# Patient Record
Sex: FEMALE | Race: WHITE | NOT HISPANIC OR LATINO | Employment: FULL TIME | URBAN - NONMETROPOLITAN AREA
[De-identification: names, ages, dates, MRNs, and addresses within clinical notes are randomized per-mention and may not be internally consistent; named-entity substitution may affect disease eponyms.]

---

## 2023-10-24 ENCOUNTER — HOSPITAL ENCOUNTER (OUTPATIENT)
Dept: RADIOLOGY | Facility: HOSPITAL | Age: 68
Discharge: HOME | End: 2023-10-24
Attending: INTERNAL MEDICINE
Payer: MEDICARE

## 2023-10-24 DIAGNOSIS — Z78.0 POST-MENOPAUSAL: ICD-10-CM

## 2023-10-24 PROCEDURE — 77080 DXA BONE DENSITY AXIAL: CPT | Mod: TC

## 2025-01-24 ENCOUNTER — OFFICE VISIT (OUTPATIENT)
Dept: PRIMARY CARE CLINIC | Facility: CLINIC | Age: 70
End: 2025-01-24
Payer: MEDICARE

## 2025-01-24 ENCOUNTER — PATIENT MESSAGE (OUTPATIENT)
Dept: PRIMARY CARE CLINIC | Facility: CLINIC | Age: 70
End: 2025-01-24

## 2025-01-24 ENCOUNTER — LAB VISIT (OUTPATIENT)
Dept: LAB | Facility: HOSPITAL | Age: 70
End: 2025-01-24
Attending: NURSE PRACTITIONER
Payer: MEDICARE

## 2025-01-24 VITALS
TEMPERATURE: 97 F | SYSTOLIC BLOOD PRESSURE: 167 MMHG | DIASTOLIC BLOOD PRESSURE: 71 MMHG | WEIGHT: 178 LBS | BODY MASS INDEX: 26.98 KG/M2 | HEIGHT: 68 IN | HEART RATE: 60 BPM | OXYGEN SATURATION: 95 %

## 2025-01-24 DIAGNOSIS — Z23 NEED FOR PNEUMOCOCCAL 20-VALENT CONJUGATE VACCINATION: ICD-10-CM

## 2025-01-24 DIAGNOSIS — M19.90 ARTHRITIS: ICD-10-CM

## 2025-01-24 DIAGNOSIS — Z11.59 ENCOUNTER FOR HEPATITIS C SCREENING TEST FOR LOW RISK PATIENT: ICD-10-CM

## 2025-01-24 DIAGNOSIS — M81.0 OSTEOPOROSIS, UNSPECIFIED OSTEOPOROSIS TYPE, UNSPECIFIED PATHOLOGICAL FRACTURE PRESENCE: ICD-10-CM

## 2025-01-24 DIAGNOSIS — H61.23 BILATERAL IMPACTED CERUMEN: ICD-10-CM

## 2025-01-24 DIAGNOSIS — H40.9 GLAUCOMA, UNSPECIFIED GLAUCOMA TYPE, UNSPECIFIED LATERALITY: ICD-10-CM

## 2025-01-24 DIAGNOSIS — Z76.89 ENCOUNTER TO ESTABLISH CARE: Primary | ICD-10-CM

## 2025-01-24 LAB — HCV AB SERPL QL IA: NORMAL

## 2025-01-24 PROCEDURE — 99205 OFFICE O/P NEW HI 60 MIN: CPT | Mod: S$PBB,,, | Performed by: NURSE PRACTITIONER

## 2025-01-24 PROCEDURE — 86803 HEPATITIS C AB TEST: CPT | Performed by: NURSE PRACTITIONER

## 2025-01-24 PROCEDURE — 36415 COLL VENOUS BLD VENIPUNCTURE: CPT | Performed by: NURSE PRACTITIONER

## 2025-01-24 PROCEDURE — 99999 PR PBB SHADOW E&M-EST. PATIENT-LVL IV: CPT | Mod: PBBFAC,,, | Performed by: NURSE PRACTITIONER

## 2025-01-24 PROCEDURE — 90677 PCV20 VACCINE IM: CPT | Mod: PBBFAC

## 2025-01-24 PROCEDURE — 99214 OFFICE O/P EST MOD 30 MIN: CPT | Mod: PBBFAC | Performed by: NURSE PRACTITIONER

## 2025-01-24 PROCEDURE — 99999PBSHW PR PBB SHADOW TECHNICAL ONLY FILED TO HB: Mod: PBBFAC,,,

## 2025-01-24 PROCEDURE — G0009 ADMIN PNEUMOCOCCAL VACCINE: HCPCS | Mod: PBBFAC

## 2025-01-24 RX ORDER — MAGNESIUM 200 MG
TABLET ORAL
COMMUNITY

## 2025-01-24 RX ORDER — FERROUS SULFATE 325(65) MG
TABLET ORAL
COMMUNITY

## 2025-01-24 RX ORDER — TIMOLOL MALEATE 5 MG/ML
1 SOLUTION/ DROPS OPHTHALMIC EVERY MORNING
COMMUNITY

## 2025-01-24 RX ORDER — CALCIUM CARB, CITRATE, MALATE 250 MG
CAPSULE ORAL
COMMUNITY

## 2025-01-24 RX ORDER — DENOSUMAB 60 MG/ML
INJECTION SUBCUTANEOUS
COMMUNITY
Start: 2024-09-30

## 2025-01-24 RX ORDER — ASPIRIN 81 MG/1
TABLET ORAL
COMMUNITY

## 2025-01-24 RX ORDER — VIT C/E/ZN/COPPR/LUTEIN/ZEAXAN 250MG-90MG
CAPSULE ORAL
COMMUNITY

## 2025-01-24 RX ORDER — VITAMIN E 268 MG
1 CAPSULE ORAL
COMMUNITY

## 2025-01-24 RX ADMIN — PNEUMOCOCCAL 20-VALENT CONJUGATE VACCINE 0.5 ML
2.2; 2.2; 2.2; 2.2; 2.2; 2.2; 2.2; 2.2; 2.2; 2.2; 2.2; 2.2; 2.2; 2.2; 2.2; 2.2; 4.4; 2.2; 2.2; 2.2 INJECTION, SUSPENSION INTRAMUSCULAR at 10:01

## 2025-01-24 NOTE — PROGRESS NOTES
[facility] Note    HPI:  Marietta Dobson is a 69 y.o. female who presents today for Establish Care (Needs prolia)     Here to establish care. No complaints today. Due for Prolia 25. 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 Benson 10/29/24. Thinks that she has Shingles vaccines with Dr. Wagner and flu shot with Dr. Benson.    Review of Systems   Constitutional: Negative.    HENT: Negative.     Eyes: Negative.    Respiratory: Negative.     Cardiovascular: Negative.    Gastrointestinal: Negative.    Genitourinary: Negative.    Musculoskeletal: Negative.    Skin: Negative.    Neurological: Negative.    Endo/Heme/Allergies: Negative.    Psychiatric/Behavioral: Negative.        A review of systems was performed and was negative except as noted above.    I personally reviewed allergies, past medical, surgical, social and family history and updated as appropriate.    Medications:    Current Outpatient Medications:     aspirin (ECOTRIN) 81 MG EC tablet, 1 tablet Orally Once a day, Disp: , Rfl:     L9-D0-IV-Zn-copper-astax-coQ10 400 mg-5 mg- 250 mcg-10 mg Tab, 1 capsule with a meal Orally Once a day, Disp: , Rfl:     cholecalciferol, vitamin D3, 125 mcg (5,000 unit) capsule, as directed Orally, Disp: , Rfl:     ferrous sulfate (FEOSOL) 325 mg (65 mg iron) Tab tablet, 1 tablet Orally Once a day, Disp: , Rfl:     Lactobacillus acidophilus (ACIDOPHILUS ORAL), as directed Orally, Disp: , Rfl:     magnesium 200 mg Tab, 2 tablets with a meal Orally Once a day, Disp: , Rfl:     potassium citrate 99 mg Cap, 1 tablet Orally Once a day, Disp: , Rfl:     PROLIA 60 mg/mL Syrg, SMARTSI Milligram(s) SUB-Q Twice a Year, Disp: , Rfl:     timolol maleate 0.5% (TIMOPTIC) 0.5 % Drop, Place 1 drop into both eyes every morning.,  Disp: , Rfl:     vitamin E 400 UNIT capsule, 1 capsule., Disp: , Rfl:   No current facility-administered medications for this visit.     Health Maintenance:  Immunization History   Administered Date(s) Administered    COVID-19, MRNA, LN-S, PF (Pfizer) (Purple Cap) 12/20/2021, 01/11/2022    Hepatitis B, Adult 02/10/2017, 04/24/2017, 09/13/2017    Influenza - Quadrivalent - High Dose - PF (65 years and older) 09/23/2020    Influenza - Quadrivalent - PF *Preferred* (6 months and older) 09/23/2017    Influenza - Trivalent - Afluria, Fluzone MDV 11/18/2005, 11/22/2015    Influenza - Trivalent - Fluzone High Dose - PF (65 years and older) 09/29/2018, 09/25/2019    Pneumococcal Conjugate - 20 Valent 01/24/2025    Pneumococcal Polysaccharide - 23 Valent 11/19/2014    Poliovirus 04/21/1963, 06/02/1963, 09/15/1964    vaccinia (smallpox) 02/02/1965      Health Maintenance   Topic Date Due    TETANUS VACCINE  Never done    Shingles Vaccine (1 of 2) Never done    Influenza Vaccine (1) 09/01/2024    COVID-19 Vaccine (3 - 2024-25 season) 09/01/2024    Mammogram  01/24/2026 (Originally 8/19/1973)    Colorectal Cancer Screening  05/02/2026    DEXA Scan  10/24/2026    RSV Vaccine (Age 60+ and Pregnant patients) (1 - 1-dose 75+ series) 08/19/2028    Lipid Panel  10/24/2029    Hepatitis C Screening  Completed    Pneumococcal Vaccines (Age 50+)  Completed     Health Maintenance Topics with due status: Not Due       Topic Last Completion Date    Colorectal Cancer Screening 05/02/2023    DEXA Scan 10/24/2023    Lipid Panel 10/24/2024    RSV Vaccine (Age 60+ and Pregnant patients) Not Due     Health Maintenance Due   Topic Date Due    TETANUS VACCINE  Never done    Shingles Vaccine (1 of 2) Never done    Influenza Vaccine (1) 09/01/2024    COVID-19 Vaccine (3 - 2024-25 season) 09/01/2024       PHYSICAL EXAM:  Vitals:    01/24/25 1009   BP: (!) 167/71   Patient Position: Sitting   Pulse: 60   Temp: 97.3 °F (36.3 °C)   SpO2: 95%   Weight:  "80.7 kg (178 lb)   Height: 5' 8" (1.727 m)     Body mass index is 27.06 kg/m².  Physical Exam  Constitutional:       Appearance: Normal appearance. She is normal weight.   HENT:      Head: Normocephalic.      Right Ear: Tympanic membrane, ear canal and external ear normal. There is impacted cerumen.      Left Ear: Tympanic membrane, ear canal and external ear normal. There is impacted cerumen.      Nose: Nose normal.      Mouth/Throat:      Mouth: Mucous membranes are moist.   Cardiovascular:      Rate and Rhythm: Normal rate and regular rhythm.      Pulses: Normal pulses.      Heart sounds: Normal heart sounds.   Pulmonary:      Effort: Pulmonary effort is normal.      Breath sounds: Normal breath sounds.   Musculoskeletal:         General: Normal range of motion.      Cervical back: Normal range of motion.   Skin:     General: Skin is warm and dry.   Neurological:      General: No focal deficit present.      Mental Status: She is alert and oriented to person, place, and time.          ASSESSMENT/PLAN:  1. Encounter to establish care    2. Need for pneumococcal 20-valent conjugate vaccination  -     pneumoc 20-mohan conj-dip cr(PF) (PREVNAR-20 (PF)) injection Syrg 0.5 mL    3. Encounter for hepatitis C screening test for low risk patient  -     Hepatitis C Antibody; Future; Expected date: 01/24/2025    4. Glaucoma, unspecified glaucoma type, unspecified laterality    5. Arthritis    6. Osteoporosis, unspecified osteoporosis type, unspecified pathological fracture presence    7. Bilateral impacted cerumen        Other than changes above, continue current medications and maintain follow up with specialists.      Follow up in about 1 week (around 1/31/2025) for bilateral ear irrigation.   Recent Results (from the past 12 weeks)   Hepatitis C Antibody    Collection Time: 01/24/25 11:47 AM   Result Value Ref Range    Hepatitis C Ab Non-reactive Non-reactive         Marcia Blereau, FNP [facility]

## 2025-02-04 ENCOUNTER — OFFICE VISIT (OUTPATIENT)
Dept: PRIMARY CARE CLINIC | Facility: CLINIC | Age: 70
End: 2025-02-04
Payer: MEDICARE

## 2025-02-04 VITALS
DIASTOLIC BLOOD PRESSURE: 86 MMHG | HEIGHT: 68 IN | OXYGEN SATURATION: 98 % | BODY MASS INDEX: 26.83 KG/M2 | WEIGHT: 177 LBS | SYSTOLIC BLOOD PRESSURE: 201 MMHG | HEART RATE: 74 BPM

## 2025-02-04 DIAGNOSIS — H61.23 BILATERAL IMPACTED CERUMEN: Primary | ICD-10-CM

## 2025-02-04 PROCEDURE — 69209 REMOVE IMPACTED EAR WAX UNI: CPT | Mod: 50,S$PBB,, | Performed by: NURSE PRACTITIONER

## 2025-02-04 PROCEDURE — 69209 REMOVE IMPACTED EAR WAX UNI: CPT | Mod: 50,PBBFAC | Performed by: NURSE PRACTITIONER

## 2025-02-04 PROCEDURE — 99213 OFFICE O/P EST LOW 20 MIN: CPT | Mod: PBBFAC | Performed by: NURSE PRACTITIONER

## 2025-02-04 PROCEDURE — 99999 PR PBB SHADOW E&M-EST. PATIENT-LVL III: CPT | Mod: PBBFAC,,, | Performed by: NURSE PRACTITIONER

## 2025-02-04 PROCEDURE — 99214 OFFICE O/P EST MOD 30 MIN: CPT | Mod: S$PBB,25,, | Performed by: NURSE PRACTITIONER

## 2025-02-04 NOTE — PROCEDURES
"Ear Cerumen Removal    Date/Time: 2/4/2025 3:15 PM    Performed by: Chela Alex NP  Authorized by: Chela Alex NP    Time out: Immediately prior to procedure a "time out" was called to verify the correct patient, procedure, equipment, support staff and site/side marked as required.    Consent Done?:  Yes (Written)    Local anesthetic:  None  Location details:  Both ears  Procedure type: irrigation    Cerumen  Removal Results:  Cerumen completely removed    "

## 2025-02-04 NOTE — PROGRESS NOTES
[facility] Note    HPI:  Marietta Dobson is a 69 y.o. female who presents today for Cerumen Impaction (Cerumen removal)         Review of Systems   HENT:  Negative for hearing loss.    Eyes:  Negative for discharge.   Respiratory:  Negative for wheezing.    Cardiovascular:  Negative for chest pain and palpitations.   Gastrointestinal:  Negative for blood in stool, constipation, diarrhea and vomiting.   Genitourinary:  Negative for dysuria and hematuria.   Musculoskeletal:  Negative for neck pain.   Neurological:  Negative for weakness and headaches.   Endo/Heme/Allergies:  Negative for polydipsia.      A review of systems was performed and was negative except as noted above.    I personally reviewed allergies, past medical, surgical, social and family history and updated as appropriate.    Medications:    Current Outpatient Medications:     aspirin (ECOTRIN) 81 MG EC tablet, 1 tablet Orally Once a day, Disp: , Rfl:     P6-E8-YA-Zn-copper-astax-coQ10 400 mg-5 mg- 250 mcg-10 mg Tab, 1 capsule with a meal Orally Once a day, Disp: , Rfl:     cholecalciferol, vitamin D3, 125 mcg (5,000 unit) capsule, as directed Orally, Disp: , Rfl:     ferrous sulfate (FEOSOL) 325 mg (65 mg iron) Tab tablet, 1 tablet Orally Once a day, Disp: , Rfl:     Lactobacillus acidophilus (ACIDOPHILUS ORAL), as directed Orally, Disp: , Rfl:     magnesium 200 mg Tab, 2 tablets with a meal Orally Once a day, Disp: , Rfl:     potassium citrate 99 mg Cap, 1 tablet Orally Once a day, Disp: , Rfl:     PROLIA 60 mg/mL Syrg, SMARTSI Milligram(s) SUB-Q Twice a Year, Disp: , Rfl:     timolol maleate 0.5% (TIMOPTIC) 0.5 % Drop, Place 1 drop into both eyes every morning., Disp: , Rfl:     vitamin E 400 UNIT capsule, 1 capsule., Disp: , Rfl:      Health Maintenance:  Immunization History   Administered Date(s) Administered    COVID-19, MRNA, LN-S, PF (Pfizer) (Purple Cap) 2021, 2022    Hepatitis B, Adult 02/10/2017,  "04/24/2017, 09/13/2017    Influenza - Quadrivalent - High Dose - PF (65 years and older) 09/23/2020    Influenza - Quadrivalent - PF *Preferred* (6 months and older) 09/23/2017    Influenza - Trivalent - Afluria, Fluzone MDV 11/18/2005, 11/22/2015    Influenza - Trivalent - Fluzone High Dose - PF (65 years and older) 09/29/2018, 09/25/2019    Pneumococcal Conjugate - 20 Valent 01/24/2025    Pneumococcal Polysaccharide - 23 Valent 11/19/2014    Poliovirus 04/21/1963, 06/02/1963, 09/15/1964    vaccinia (smallpox) 02/02/1965      Health Maintenance   Topic Date Due    TETANUS VACCINE  Never done    Shingles Vaccine (1 of 2) Never done    Influenza Vaccine (1) 09/01/2024    COVID-19 Vaccine (3 - 2024-25 season) 01/24/2026 (Originally 9/1/2024)    Mammogram  01/24/2026 (Originally 8/19/1973)    Colorectal Cancer Screening  05/02/2026    DEXA Scan  10/24/2026    RSV Vaccine (Age 60+ and Pregnant patients) (1 - 1-dose 75+ series) 08/19/2028    Lipid Panel  10/24/2029    Hepatitis C Screening  Completed    Pneumococcal Vaccines (Age 50+)  Completed     Health Maintenance Topics with due status: Not Due       Topic Last Completion Date    Colorectal Cancer Screening 05/02/2023    DEXA Scan 10/24/2023    Lipid Panel 10/24/2024    RSV Vaccine (Age 60+ and Pregnant patients) Not Due     Health Maintenance Due   Topic Date Due    TETANUS VACCINE  Never done    Shingles Vaccine (1 of 2) Never done    Influenza Vaccine (1) 09/01/2024       PHYSICAL EXAM:  Vitals:    02/04/25 1520   BP: (!) 201/86   Patient Position: Sitting   Pulse: 74   SpO2: 98%   Weight: 80.3 kg (177 lb)   Height: 5' 8" (1.727 m)     Body mass index is 26.91 kg/m².  Physical Exam  HENT:      Right Ear: There is impacted cerumen.      Left Ear: There is impacted cerumen.   Cardiovascular:      Rate and Rhythm: Normal rate and regular rhythm.      Heart sounds: Normal heart sounds.   Pulmonary:      Effort: Pulmonary effort is normal.      Breath sounds: Normal " breath sounds.          ASSESSMENT/PLAN:  1. Bilateral impacted cerumen  -     Ear Cerumen Removal        Other than changes above, continue current medications and maintain follow up with specialists.      No follow-ups on file.   Recent Results (from the past 12 weeks)   Hepatitis C Antibody    Collection Time: 01/24/25 11:47 AM   Result Value Ref Range    Hepatitis C Ab Non-reactive Non-reactive         Chela Alex, FNP [facility]                  Answers submitted by the patient for this visit:  Review of Systems Questionnaire (Submitted on 2/1/2025)  activity change: No  unexpected weight change: No  rhinorrhea: No  trouble swallowing: No  visual disturbance: No  chest tightness: No  polyuria: No  difficulty urinating: No  menstrual problem: No  joint swelling: No  arthralgias: No  confusion: No  dysphoric mood: No

## 2025-04-02 ENCOUNTER — INFUSION (OUTPATIENT)
Dept: INFUSION THERAPY | Facility: HOSPITAL | Age: 70
End: 2025-04-02
Attending: NURSE PRACTITIONER
Payer: MEDICARE

## 2025-04-02 VITALS
SYSTOLIC BLOOD PRESSURE: 174 MMHG | RESPIRATION RATE: 20 BRPM | OXYGEN SATURATION: 96 % | HEART RATE: 72 BPM | DIASTOLIC BLOOD PRESSURE: 73 MMHG | TEMPERATURE: 98 F

## 2025-04-02 DIAGNOSIS — M81.0 OSTEOPOROSIS, UNSPECIFIED OSTEOPOROSIS TYPE, UNSPECIFIED PATHOLOGICAL FRACTURE PRESENCE: Primary | ICD-10-CM

## 2025-04-02 PROCEDURE — 63600175 PHARM REV CODE 636 W HCPCS: Mod: JZ | Performed by: NURSE PRACTITIONER

## 2025-04-02 PROCEDURE — 96372 THER/PROPH/DIAG INJ SC/IM: CPT

## 2025-04-02 RX ADMIN — DENOSUMAB 60 MG: 60 INJECTION SUBCUTANEOUS at 11:04

## 2025-04-02 NOTE — PROGRESS NOTES
PATIENT HERE FOR PROLIA INJECTION    1155 PROLIA 60 MG GIVEN SUB Q IN   LEFT      ARM  PATIENT VOICES NO C/O    DISCHARGED IN STABLE CONDITION   AMBULATORY